# Patient Record
Sex: MALE | Race: WHITE | Employment: FULL TIME | URBAN - METROPOLITAN AREA
[De-identification: names, ages, dates, MRNs, and addresses within clinical notes are randomized per-mention and may not be internally consistent; named-entity substitution may affect disease eponyms.]

---

## 2020-03-04 ENCOUNTER — NURSE TRIAGE (OUTPATIENT)
Dept: NURSING | Facility: CLINIC | Age: 35
End: 2020-03-04

## 2020-03-05 ENCOUNTER — OFFICE VISIT (OUTPATIENT)
Dept: FAMILY MEDICINE | Facility: CLINIC | Age: 35
End: 2020-03-05
Payer: COMMERCIAL

## 2020-03-05 ENCOUNTER — ANCILLARY PROCEDURE (OUTPATIENT)
Dept: GENERAL RADIOLOGY | Facility: CLINIC | Age: 35
End: 2020-03-05
Attending: NURSE PRACTITIONER
Payer: COMMERCIAL

## 2020-03-05 VITALS
BODY MASS INDEX: 27.04 KG/M2 | HEIGHT: 73 IN | SYSTOLIC BLOOD PRESSURE: 122 MMHG | WEIGHT: 204 LBS | TEMPERATURE: 98 F | DIASTOLIC BLOOD PRESSURE: 76 MMHG | HEART RATE: 92 BPM | OXYGEN SATURATION: 97 %

## 2020-03-05 DIAGNOSIS — M76.60 ACHILLES TENDON PAIN: ICD-10-CM

## 2020-03-05 DIAGNOSIS — S99.911A ANKLE INJURY, RIGHT, INITIAL ENCOUNTER: ICD-10-CM

## 2020-03-05 DIAGNOSIS — S99.911A ANKLE INJURY, RIGHT, INITIAL ENCOUNTER: Primary | ICD-10-CM

## 2020-03-05 PROCEDURE — 73610 X-RAY EXAM OF ANKLE: CPT | Mod: RT

## 2020-03-05 PROCEDURE — 99203 OFFICE O/P NEW LOW 30 MIN: CPT | Performed by: NURSE PRACTITIONER

## 2020-03-05 ASSESSMENT — MIFFLIN-ST. JEOR: SCORE: 1924.22

## 2020-03-05 NOTE — PROGRESS NOTES
"Subjective     Adolfo Lopez is a 35 year old male who presents to clinic today for the following health issues:      Musculoskeletal problem/pain      Duration: 24hrs    Description  Location: R lower leg    Intensity:  moderate, severe    Accompanying signs and symptoms: swelling    History  Previous similar problem: no   Previous evaluation:  none    Precipitating or alleviating factors:  Trauma or overuse: YES- injured last night playing basketball  Aggravating factors include: standing, walking, climbing stairs and exercise    Therapies tried and outcome: ice and Ibuprofen    HPI: Adolfo presents today with the complaint of right Achilles tendon pain, bruising, and swelling. He suffered an injury in this region last evening while playing basketball. He states that he felt like someone \"kicked him\" in the back of his ankle after coming down after grabbing a rebound last night (though there was no contact trauma). He has noted bruising and swelling (especially over the medial aspect of his Achilles tendon region ever since. He is able to ambulate, though it is painful with stepping. Denies pain or swelling anywhere else in his ankle or foot.       There is no problem list on file for this patient.    Past Surgical History:   Procedure Laterality Date     NO HISTORY OF SURGERY         Social History     Tobacco Use     Smoking status: Never Smoker     Smokeless tobacco: Never Used   Substance Use Topics     Alcohol use: No     Family History   Problem Relation Age of Onset     Arthritis Mother         Alive     Family History Negative Father         Alive     Cancer Maternal Grandmother         ovarian ca--           Reviewed and updated as needed this visit by Provider  Tobacco  Allergies  Meds  Problems  Med Hx  Surg Hx  Fam Hx         Review of Systems   ROS COMP: Constitutional, musculoskeletal, neuro systems are negative, except as otherwise noted.      Objective    /76   Pulse 92   Temp " "98  F (36.7  C)   Ht 1.854 m (6' 1\")   Wt 92.5 kg (204 lb)   SpO2 97%   BMI 26.91 kg/m    Body mass index is 26.91 kg/m .  Physical Exam   GENERAL: healthy, alert and no distress  MS: Right foot / ankle / lower leg - Foot normal with no obvious deformity. Ankle without notable deformity, swelling, bruising, redness, heat. Proximal to ankle (over Achilles tendon) with notable swelling, bruising, redness, and heat (especially medial aspect). ROM intact though pain with dorsiflexion. Minimal tenderness.   NEURO: Normal strength and tone, mentation intact and speech normal    Diagnostic Test Results:  Labs reviewed in Epic  Xray - Personally-reviewed. No evidence of fracture or malalignment in ankle region. Soft tissue swelling evident proximal to ankle region.         Assessment & Plan     Adolfo was seen today for musculoskeletal problem.    Diagnoses and all orders for this visit:    Ankle injury, right, initial encounter  Comment: No evidence of fracture or malalignment noted on x-ray. Likely some degree of Achilles tendon injury / tear / tendonitis. Will send him for US and follow up based on results (ie sports medicine referral +/- formal PT). In the meantime, I have suggested: Ice, NSAIDs, rest, elevation, taping to keep foot plantarflexed to offload Achilles tendon, heel lift to offload Achilles tendon. I will be in touch with ultrasound results.   -     XR Ankle Right G/E 3 Views; Future  -     US Extremity Non Vascular Right; Future    Achilles tendon pain  -     US Extremity Non Vascular Right; Future         BMI:   Estimated body mass index is 26.91 kg/m  as calculated from the following:    Height as of this encounter: 1.854 m (6' 1\").    Weight as of this encounter: 92.5 kg (204 lb).   Weight management plan: Discussed healthy diet and exercise guidelines      See Patient Instructions    Return in about 2 weeks (around 3/19/2020) for Routine Visit, Imaging study. Call [phone] to schedule " this.    Aj Fields NP  [facility]

## 2020-03-05 NOTE — TELEPHONE ENCOUNTER
"Adolfo reports that he thinks he \"ruptured his Achilles tendon\" in a basketball game. Right foot/ankle is affected. Able to walk, limping, pain is 5/10. Ankle has started to swell. No numbness of his toes.    Per protocol, advised to be seen within 24 hours. Care advice reviewed. Patient verbalizes understanding and plans to go to  tomorrow. Advised to call back with further questions/concerns.       Nataliia Hussein RN/[place] Nurse Advisor          Reason for Disposition    [1] Limp when walking AND [2] due to a twisted ankle or foot    Additional Information    Negative: Serious injury with multiple fractures    Negative: [1] Major bleeding (e.g., actively dripping or spurting) AND [2] can't be stopped    Negative: Amputation    Negative: Looks like a dislocated joint (very crooked or deformed)    Negative: Sounds like a life-threatening emergency to the triager    Negative: Bullet wound, stabbed by knife, or other serious penetrating wound    Negative: Skin is split open or gaping  (or length > 1/2 inch or 12 mm)    Negative: [1] Bleeding AND [2] won't stop after 10 minutes of direct pressure (using correct technique)    Negative: [1] Dirt in the wound AND [2] not removed with 15 minutes of scrubbing    Negative: Can't stand (bear weight) or walk    Negative: [1] Numbness (new loss of sensation) of toe(s) AND [2] present now    Negative: Sounds like a serious injury to the triager    Negative: [1] SEVERE pain AND [2] not improved 2 hours after pain medicine/ice packs    Negative: Suspicious history for the injury    Protocols used: FOOT AND ANKLE INJURY-A-AH      "

## 2020-03-05 NOTE — PATIENT INSTRUCTIONS
1. Ice  2. NSAIDs   3. Rest  4. Heel lift or taping to support  5. Sports Medicine and/or  6. PT / Rehab    7. US

## 2021-05-27 ENCOUNTER — RECORDS - HEALTHEAST (OUTPATIENT)
Dept: ADMINISTRATIVE | Facility: CLINIC | Age: 36
End: 2021-05-27